# Patient Record
Sex: MALE | Race: WHITE | Employment: FULL TIME | URBAN - METROPOLITAN AREA
[De-identification: names, ages, dates, MRNs, and addresses within clinical notes are randomized per-mention and may not be internally consistent; named-entity substitution may affect disease eponyms.]

---

## 2017-06-22 ENCOUNTER — LAB ENCOUNTER (OUTPATIENT)
Dept: LAB | Age: 52
End: 2017-06-22
Attending: FAMILY MEDICINE
Payer: COMMERCIAL

## 2017-06-22 ENCOUNTER — TELEPHONE (OUTPATIENT)
Dept: FAMILY MEDICINE CLINIC | Facility: CLINIC | Age: 52
End: 2017-06-22

## 2017-06-22 ENCOUNTER — OFFICE VISIT (OUTPATIENT)
Dept: FAMILY MEDICINE CLINIC | Facility: CLINIC | Age: 52
End: 2017-06-22

## 2017-06-22 VITALS
HEART RATE: 68 BPM | HEIGHT: 69 IN | TEMPERATURE: 98 F | DIASTOLIC BLOOD PRESSURE: 78 MMHG | BODY MASS INDEX: 39.55 KG/M2 | SYSTOLIC BLOOD PRESSURE: 130 MMHG | WEIGHT: 267 LBS | RESPIRATION RATE: 12 BRPM

## 2017-06-22 DIAGNOSIS — Z12.5 SCREENING FOR PROSTATE CANCER: ICD-10-CM

## 2017-06-22 DIAGNOSIS — Z13.29 SCREENING FOR THYROID DISORDER: ICD-10-CM

## 2017-06-22 DIAGNOSIS — Z13.220 SCREENING, LIPID: ICD-10-CM

## 2017-06-22 DIAGNOSIS — Z13.1 SCREENING FOR DIABETES MELLITUS: ICD-10-CM

## 2017-06-22 DIAGNOSIS — R07.9 CHEST PAIN, UNSPECIFIED TYPE: ICD-10-CM

## 2017-06-22 DIAGNOSIS — Z13.0 SCREENING, ANEMIA, DEFICIENCY, IRON: ICD-10-CM

## 2017-06-22 DIAGNOSIS — Z13.29 SCREENING FOR THYROID DISORDER: Primary | ICD-10-CM

## 2017-06-22 DIAGNOSIS — Z82.49 FAMILY HISTORY OF HEART DISEASE: ICD-10-CM

## 2017-06-22 DIAGNOSIS — Z83.49 FAMILY HISTORY OF THYROID DISEASE IN MOTHER: ICD-10-CM

## 2017-06-22 PROCEDURE — 36415 COLL VENOUS BLD VENIPUNCTURE: CPT

## 2017-06-22 PROCEDURE — 84443 ASSAY THYROID STIM HORMONE: CPT

## 2017-06-22 PROCEDURE — 99204 OFFICE O/P NEW MOD 45 MIN: CPT | Performed by: FAMILY MEDICINE

## 2017-06-22 PROCEDURE — 85025 COMPLETE CBC W/AUTO DIFF WBC: CPT

## 2017-06-22 NOTE — PROGRESS NOTES
Gigi Blood is a 47year old male. S:  Patient presents today with the following concerns:  · Patient here for thyroid testing. Both parents with thyroid disease. Fatigue. Weight gain. · Rash on back. ? Ring worm. Son is a wrestler.     · 76 year intact,motor and sensory are grossly intact    ASSESSMENT AND PLAN:  Richard Alcantara is a 47year old male.   Screening for thyroid disorder  (primary encounter diagnosis)  Screening, anemia, deficiency, iron  Screening for prostate cancer  Family history of he

## 2017-06-22 NOTE — TELEPHONE ENCOUNTER
Patient was in today and we did not discuss colon cancer screening. Recommend colonoscopy or FIT testing if he declines colonoscopy. May refer to Dr. Toya Owens. Thanks.

## 2017-06-22 NOTE — PATIENT INSTRUCTIONS
Risk Factors for Heart Disease  A risk factor is something that increases your chance of having heart disease. Heart disease (also called coronary artery disease) involves damage to the heart arteries.  These blood vessels need to work well to provide the This is the most important risk factor you can change. Smoking causes inflammation and damages the smooth muscle that lines the arteries making them less flexible. It also raises your blood pressure, causing further damage to the artery lining.  Smoking als Diabetes occurs when you have high levels of sugar (glucose) in your blood. This can damage arteries if not kept under control. Having diabetes also makes you more likely to have a silent heart attack—one without any symptoms.   Excess weight  Excess weight

## 2017-09-18 ENCOUNTER — LABORATORY ENCOUNTER (OUTPATIENT)
Dept: LAB | Age: 52
End: 2017-09-18
Attending: INTERNAL MEDICINE
Payer: COMMERCIAL

## 2017-09-18 DIAGNOSIS — Z12.11 SPECIAL SCREENING FOR MALIGNANT NEOPLASM OF COLON: Primary | ICD-10-CM

## 2017-09-18 PROCEDURE — 88305 TISSUE EXAM BY PATHOLOGIST: CPT

## 2017-10-07 ENCOUNTER — APPOINTMENT (OUTPATIENT)
Dept: LAB | Age: 52
End: 2017-10-07
Attending: FAMILY MEDICINE
Payer: COMMERCIAL

## 2017-10-07 DIAGNOSIS — Z13.220 SCREENING, LIPID: ICD-10-CM

## 2017-10-07 DIAGNOSIS — Z13.1 SCREENING FOR DIABETES MELLITUS: ICD-10-CM

## 2017-10-07 PROCEDURE — 80061 LIPID PANEL: CPT

## 2017-10-07 PROCEDURE — 80053 COMPREHEN METABOLIC PANEL: CPT

## 2017-10-07 PROCEDURE — 36415 COLL VENOUS BLD VENIPUNCTURE: CPT

## 2017-10-10 DIAGNOSIS — R73.9 ELEVATED SERUM GLUCOSE: Primary | ICD-10-CM

## 2017-10-10 DIAGNOSIS — E78.00 ELEVATED CHOLESTEROL: ICD-10-CM

## 2018-06-01 ENCOUNTER — HOSPITAL ENCOUNTER (INPATIENT)
Facility: HOSPITAL | Age: 53
LOS: 2 days | Discharge: HOME | DRG: 392 | End: 2018-06-03
Attending: EMERGENCY MEDICINE | Admitting: HOSPITALIST
Payer: COMMERCIAL

## 2018-06-01 ENCOUNTER — HOSPITAL ENCOUNTER (OUTPATIENT)
Age: 53
Discharge: ANOTHER HOSPITAL | End: 2018-06-01
Attending: FAMILY MEDICINE
Payer: COMMERCIAL

## 2018-06-01 ENCOUNTER — APPOINTMENT (OUTPATIENT)
Dept: CT IMAGING | Age: 53
End: 2018-06-01
Attending: FAMILY MEDICINE
Payer: COMMERCIAL

## 2018-06-01 ENCOUNTER — TELEPHONE (OUTPATIENT)
Dept: FAMILY MEDICINE CLINIC | Facility: CLINIC | Age: 53
End: 2018-06-01

## 2018-06-01 VITALS
SYSTOLIC BLOOD PRESSURE: 127 MMHG | OXYGEN SATURATION: 96 % | HEIGHT: 69 IN | RESPIRATION RATE: 16 BRPM | WEIGHT: 255 LBS | BODY MASS INDEX: 37.77 KG/M2 | DIASTOLIC BLOOD PRESSURE: 93 MMHG | HEART RATE: 96 BPM | TEMPERATURE: 98 F

## 2018-06-01 DIAGNOSIS — R82.2 BILIRUBINURIA: ICD-10-CM

## 2018-06-01 DIAGNOSIS — R10.32 LLQ ABDOMINAL PAIN: ICD-10-CM

## 2018-06-01 DIAGNOSIS — K57.92 ACUTE DIVERTICULITIS: Primary | ICD-10-CM

## 2018-06-01 DIAGNOSIS — K56.609 SMALL BOWEL OBSTRUCTION (HCC): ICD-10-CM

## 2018-06-01 DIAGNOSIS — K56.609 SBO (SMALL BOWEL OBSTRUCTION) (HCC): ICD-10-CM

## 2018-06-01 PROCEDURE — 5A09357 ASSISTANCE WITH RESPIRATORY VENTILATION, LESS THAN 24 CONSECUTIVE HOURS, CONTINUOUS POSITIVE AIRWAY PRESSURE: ICD-10-PCS | Performed by: HOSPITALIST

## 2018-06-01 PROCEDURE — 81002 URINALYSIS NONAUTO W/O SCOPE: CPT | Performed by: FAMILY MEDICINE

## 2018-06-01 PROCEDURE — 99223 1ST HOSP IP/OBS HIGH 75: CPT | Performed by: HOSPITALIST

## 2018-06-01 PROCEDURE — 99204 OFFICE O/P NEW MOD 45 MIN: CPT

## 2018-06-01 PROCEDURE — 99214 OFFICE O/P EST MOD 30 MIN: CPT

## 2018-06-01 PROCEDURE — 80047 BASIC METABLC PNL IONIZED CA: CPT

## 2018-06-01 PROCEDURE — 85025 COMPLETE CBC W/AUTO DIFF WBC: CPT | Performed by: FAMILY MEDICINE

## 2018-06-01 PROCEDURE — 74176 CT ABD & PELVIS W/O CONTRAST: CPT | Performed by: FAMILY MEDICINE

## 2018-06-01 PROCEDURE — 36415 COLL VENOUS BLD VENIPUNCTURE: CPT

## 2018-06-01 PROCEDURE — 99253 IP/OBS CNSLTJ NEW/EST LOW 45: CPT | Performed by: SURGERY

## 2018-06-01 RX ORDER — HYDROMORPHONE HYDROCHLORIDE 1 MG/ML
0.2 INJECTION, SOLUTION INTRAMUSCULAR; INTRAVENOUS; SUBCUTANEOUS EVERY 2 HOUR PRN
Status: DISCONTINUED | OUTPATIENT
Start: 2018-06-01 | End: 2018-06-03

## 2018-06-01 RX ORDER — METRONIDAZOLE 500 MG/100ML
500 INJECTION, SOLUTION INTRAVENOUS EVERY 8 HOURS
Status: DISCONTINUED | OUTPATIENT
Start: 2018-06-01 | End: 2018-06-03

## 2018-06-01 RX ORDER — HYDROMORPHONE HYDROCHLORIDE 1 MG/ML
0.8 INJECTION, SOLUTION INTRAMUSCULAR; INTRAVENOUS; SUBCUTANEOUS EVERY 2 HOUR PRN
Status: DISCONTINUED | OUTPATIENT
Start: 2018-06-01 | End: 2018-06-03

## 2018-06-01 RX ORDER — METRONIDAZOLE 500 MG/100ML
500 INJECTION, SOLUTION INTRAVENOUS ONCE
Status: COMPLETED | OUTPATIENT
Start: 2018-06-01 | End: 2018-06-01

## 2018-06-01 RX ORDER — ONDANSETRON 2 MG/ML
4 INJECTION INTRAMUSCULAR; INTRAVENOUS EVERY 6 HOURS PRN
Status: DISCONTINUED | OUTPATIENT
Start: 2018-06-01 | End: 2018-06-03

## 2018-06-01 RX ORDER — ENOXAPARIN SODIUM 100 MG/ML
40 INJECTION SUBCUTANEOUS EVERY EVENING
Status: DISCONTINUED | OUTPATIENT
Start: 2018-06-01 | End: 2018-06-03

## 2018-06-01 RX ORDER — SODIUM CHLORIDE 9 MG/ML
125 INJECTION, SOLUTION INTRAVENOUS CONTINUOUS
Status: DISCONTINUED | OUTPATIENT
Start: 2018-06-01 | End: 2018-06-03

## 2018-06-01 RX ORDER — SODIUM CHLORIDE 9 MG/ML
INJECTION, SOLUTION INTRAVENOUS CONTINUOUS
Status: DISCONTINUED | OUTPATIENT
Start: 2018-06-01 | End: 2018-06-03

## 2018-06-01 RX ORDER — FAMOTIDINE 10 MG/ML
20 INJECTION, SOLUTION INTRAVENOUS DAILY
Status: DISCONTINUED | OUTPATIENT
Start: 2018-06-01 | End: 2018-06-03

## 2018-06-01 RX ORDER — HYDROMORPHONE HYDROCHLORIDE 1 MG/ML
0.4 INJECTION, SOLUTION INTRAMUSCULAR; INTRAVENOUS; SUBCUTANEOUS EVERY 2 HOUR PRN
Status: DISCONTINUED | OUTPATIENT
Start: 2018-06-01 | End: 2018-06-03

## 2018-06-01 RX ORDER — CLOTRIMAZOLE AND BETAMETHASONE DIPROPIONATE 10; .64 MG/G; MG/G
1 CREAM TOPICAL 2 TIMES DAILY PRN
COMMUNITY
End: 2019-03-29

## 2018-06-01 RX ORDER — METOCLOPRAMIDE HYDROCHLORIDE 5 MG/ML
10 INJECTION INTRAMUSCULAR; INTRAVENOUS EVERY 8 HOURS PRN
Status: DISCONTINUED | OUTPATIENT
Start: 2018-06-01 | End: 2018-06-03

## 2018-06-01 NOTE — TELEPHONE ENCOUNTER
Pt c/o constipation and abdominal cramping x 3 days , fever 101 yesterday- no temp taken today and fatigue x 3 days. Advised Pt to go to Immediate Care for evaluation.  Pt verbalized understanding

## 2018-06-01 NOTE — PROGRESS NOTES
[facility] Pharmacy Note: Antimicrobial Weight Dose Adjustment for: ceftriaxone (ROCEPHIN)    Daniel Mendez is a 47year old male who has been prescribed ceftriaxone (ROCEPHIN) 1g q24hr    Weight: 115.7kg  BMI: 37.66 kg/m².     Based on this criteria the dose was

## 2018-06-01 NOTE — ED PROVIDER NOTES
Patient Seen in: [address] [facility]    History   Patient presents with:  Abdomen/Flank Pain (GI/)  Constipation (gastrointestinal)  Fever (infectious)    Stated Complaint: abd cramping/constipation/fever    HPI    This 61-year-old male pre Temporal  SpO2: 96 %  O2 Device: None (Room air)    Current:/85   Pulse 84   Temp 97.6 °F (36.4 °C) (Temporal)   Resp 14   Ht 175.3 cm (5' 9\")   Wt 115.7 kg   SpO2 96%   BMI 37.66 kg/m²         Physical Exam    General: WH/WN/WD, in mild discomfort Cristela, CT ABD   PEL W/CONT - SET, 7/06/2011, 14:33. INDICATIONS:  abd cramping/constipation/fever  TECHNIQUE:  Unenhanced multislice CT scanning was performed from the dome of the diaphragm to the pubic symphysis. Dose reduction techniques were used.  Dose is a 5-6 cm segment of acute sigmoid diverticulitis. There is marked infiltration of the fat with phlegmon extending superiorly in the mesenteric fat to involving loops of bowel in the left upper pelvis/lower abdomen .   There is a transition zone with mod

## 2018-06-01 NOTE — H&P
[facility] HOSPITALIST  History and Physical     Will Conn Patient Status:  Emergency    1965 MRN [id number]   Location [address] [facility] Attending Concetta Miller MD   Hosp Day # 0 PCP Alvin Steven MD     Chief Complaint: Ford Gomez 10-14 days. Disp: 60 g Rfl: 1       Review of Systems:   A comprehensive 14 point review of systems was completed. Pertinent positives and negatives noted in the HPI.     Physical Exam:    /71   Pulse 78   Temp 98.3 °F (36.8 °C) (Temporal)   Resp 1

## 2018-06-01 NOTE — ED INITIAL ASSESSMENT (HPI)
Pt reports 3 days of abd pain and constipation. Went to IC and diagnosed with diverticulitis and SBO. Presents to ER with IV intact and by personal vehicle.

## 2018-06-01 NOTE — ED PROVIDER NOTES
Patient Seen in: [facility] Emergency Department    History   Patient presents with:  Abdomen/Flank Pain (GI/)    Stated Complaint: from [facility] for bowel obstruction and diverticulitis, LLQ pain.      HPI    Patient has been having problems of constip Normal range of motion. Neck supple. No JVD present. Cardiovascular: Normal rate and regular rhythm. Pulmonary/Chest: Effort normal and breath sounds normal. No stridor. Abdominal: Soft. Mild tenderness left lower quadrant.   Not distended, scant rayray

## 2018-06-01 NOTE — CONSULTS
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 No acute fracture. LUNG BASES:  No visible pulmonary or pleural disease.     OTHER:  Negative.       =====  CONCLUSION:  There is a 5-6 cm segment of acute sigmoid diverticulitis membranes are moist.    Lungs: Clear to auscultation bilaterally. Cardiac: Regular rate and rhythm. No murmur. Abdomen:  Soft, non-distended. LLQ/suprapubic tenderness to palpation. No rebound or guarding. No peritoneal signs. No ascites.  .    Tad Shall

## 2018-06-02 PROCEDURE — 99233 SBSQ HOSP IP/OBS HIGH 50: CPT | Performed by: SURGERY

## 2018-06-02 PROCEDURE — 99232 SBSQ HOSP IP/OBS MODERATE 35: CPT | Performed by: SURGERY

## 2018-06-02 PROCEDURE — 99233 SBSQ HOSP IP/OBS HIGH 50: CPT | Performed by: HOSPITALIST

## 2018-06-02 RX ORDER — POTASSIUM CHLORIDE 20 MEQ/1
40 TABLET, EXTENDED RELEASE ORAL ONCE
Status: DISCONTINUED | OUTPATIENT
Start: 2018-06-02 | End: 2018-06-03

## 2018-06-02 NOTE — PROGRESS NOTES
[facility]  Progress Note    Roseline Cranker Patient Status:  Observation    1965 MRN [id number]   [facility] 3NW-A Attending Karen Paul MD   Hosp Day # 0 PCP Tierney Akhtar MD     Subjective:  Feels better this morning.   No nause Start clear liquid diet. 3. Continue IV antibiotics. 4. Encouraged ambulation, up in chair. 5. No acute surgical intervention required at this time. 6. All questions answered.       Viji Day MD  6/2/2018  7:25 AM

## 2018-06-02 NOTE — PLAN OF CARE
Pt tolerated CL for breakfast but c/o slight nausea at this time. Pt rating abd pain at 1. Pt instructed to take only water for now in sm amt until nausea passes completely. Pt reported that he flet warm - temp 98.9.

## 2018-06-02 NOTE — RESPIRATORY THERAPY NOTE
CLAUDE - Equipment Use Daily Summary:  · Set Mode CPAP  · Usage in hours: 9:24  · 90% Pressure (EPAP) level: 10.0  · 90% Insp Pressure (IPAP):   · AHI: 1.6  · Supplemental Oxygen:   · Comments:

## 2018-06-02 NOTE — PROGRESS NOTES
[facility] HOSPITALIST  Progress Note     Pheobe Muss Patient Status:  Inpatient    1965 MRN [id number]   [facility] 3NW-A Attending Spring Gonzalez MD   Hosp Day # 1 PCP Daniel Lofton MD     Chief Complaint: Abdominal pain    S: Patient s obstruction  1. Start CLD, cont pain control  2. IV abx  3. Surgery following  2.  CLAUDE       Plan of care: CLD, cont IV abx    Quality:  · DVT Prophylaxis: lovenox  · CODE status: Full  · Cormier: no  · Central line: no    Estimated date of discharge: 6/3-6/4

## 2018-06-03 VITALS
DIASTOLIC BLOOD PRESSURE: 93 MMHG | SYSTOLIC BLOOD PRESSURE: 148 MMHG | TEMPERATURE: 98 F | HEART RATE: 61 BPM | WEIGHT: 255 LBS | HEIGHT: 69 IN | BODY MASS INDEX: 37.77 KG/M2 | OXYGEN SATURATION: 98 % | RESPIRATION RATE: 18 BRPM

## 2018-06-03 PROCEDURE — 99232 SBSQ HOSP IP/OBS MODERATE 35: CPT | Performed by: SURGERY

## 2018-06-03 PROCEDURE — 99233 SBSQ HOSP IP/OBS HIGH 50: CPT | Performed by: SURGERY

## 2018-06-03 PROCEDURE — 99239 HOSP IP/OBS DSCHRG MGMT >30: CPT | Performed by: HOSPITALIST

## 2018-06-03 RX ORDER — AMOXICILLIN AND CLAVULANATE POTASSIUM 875; 125 MG/1; MG/1
1 TABLET, FILM COATED ORAL 2 TIMES DAILY
Qty: 20 TABLET | Refills: 0 | Status: SHIPPED | OUTPATIENT
Start: 2018-06-03 | End: 2018-06-13

## 2018-06-03 NOTE — PROGRESS NOTES
[facility]  Progress Note    Thania Braxton Patient Status:  Inpatient    1965 MRN [id number]   [facility] 3NW-A Attending Erick Michaels MD   Hosp Day # 2 PCP Princess Velazquez MD     Subjective:  Feels good. No pain.   Two BMs yesterd

## 2018-06-03 NOTE — PLAN OF CARE
Problem: Patient/Family Goals  Goal: Patient/Family Short Term Goal  Patient's Short Term Goal: \"Eat solid food and go home\".     Interventions:   - continue plan of care, diet advanced as tolerated, IV abx.  -Advance diet when ordered by physician, monit

## 2018-06-03 NOTE — PLAN OF CARE
Problem: Patient/Family Goals  Goal: Patient/Family Long Term Goal  Patient's Long Term Goal: \"To eat and go home\"    Interventions:  - Follow plan of care including diet as ordered by physician and antibiotics.  Diet advanced to FLD for BF, if tolerated Obtain nutritional consult as needed  - Evaluate fluid balance    Outcome: Adequate for Discharge  Pt denies nausea, tolerating diet advancement so far well.   Goal: Maintains or returns to baseline bowel function  INTERVENTIONS:  - Assess bowel function  -

## 2018-06-03 NOTE — RESPIRATORY THERAPY NOTE
CLAUDE - Equipment Use Daily Summary:  · Set Mode CFLEX  · Usage in hours: 8  · 90% Pressure (EPAP) level:   · 90% Insp Pressure (IPAP): 10  · AHI: 2  · Supplemental Oxygen:  · Comments:

## 2018-06-03 NOTE — PLAN OF CARE
Pt did well, tolerated soft diet for lunch, had some eggs and Singaporean toast. No nausea, no pain. Prescription for Augmentin and low residue diet reviewed, discharging instructions as well with both pt and wife. All questions answered. Voiced understanding.

## 2018-06-03 NOTE — PROGRESS NOTES
NO HOSPITALIST  Progress Note     Mine Otto Patient Status:  Inpatient    1965 MRN [id number]   [facility] 3NW-A Attending Cory Calderon MD   Hosp Day # 2 PCP Nayeli Olson MD     Chief Complaint: Abdominal pain    S: Patient d obstruction  1. Resolving  2. Tolerating diet, pain controlled  3. Home on PO abx  2.  CLAUDE       Plan of care: d/c home today    Quality:  · DVT Prophylaxis: lovenox  · CODE status: Full  · Cormier: no  · Central line: no    Estimated date of discharge: 6/3

## 2018-06-04 NOTE — DISCHARGE SUMMARY
[facility] HOSPITALIST  DISCHARGE SUMMARY     Gilbert Muse Patient Status:  Inpatient    1965 MRN [id number]   [facility] 3NW-A Attending No att. providers found   Hosp Day # 2 PCP Jenifer Roa MD     Date of Admission: 2018  Date of pain medication, antiemetics. His symptoms improved. He was started on clear liquid diet which was advanced and tolerated well by the patient. He was cleared for discharge per surgery and discharged home on antibiotics to complete a course of 10 days. edema.  -----------------------------------------------------------------------------------------------  PATIENT DISCHARGE INSTRUCTIONS: See electronic chart    Yisel Ruggiero MD 6/4/2018    Time spent:  > 30 minutes

## 2018-06-05 ENCOUNTER — PATIENT OUTREACH (OUTPATIENT)
Dept: CASE MANAGEMENT | Age: 53
End: 2018-06-05

## 2018-06-05 DIAGNOSIS — K57.92 ACUTE DIVERTICULITIS: ICD-10-CM

## 2018-06-05 DIAGNOSIS — K56.609 SBO (SMALL BOWEL OBSTRUCTION) (HCC): ICD-10-CM

## 2018-06-06 ENCOUNTER — TELEPHONE (OUTPATIENT)
Dept: FAMILY MEDICINE CLINIC | Facility: CLINIC | Age: 53
End: 2018-06-06

## 2018-06-06 NOTE — TELEPHONE ENCOUNTER
Called patient to scheduled HFU and patient stated he was told to follow up with Dr. Gilberto Burt. Patient requesting to speak to nurse to clarify why he needs to see Dr. Marine Moon for the follow up.

## 2018-06-06 NOTE — TELEPHONE ENCOUNTER
Patient was discharged from [facility] on 6/3/18 and is at High risk for readmission and recommended to have a TCM HFU appointment by 6/10/18. No later than 6/17/18. Multiple attempts to reach patient have been made.      Triage: Please call the red

## 2018-06-06 NOTE — TELEPHONE ENCOUNTER
Called and talked to patient explained why he needs a follow up post hospitalization he then made an appointment for tomorrow with Dr Sanchez Herzog

## 2018-06-07 ENCOUNTER — OFFICE VISIT (OUTPATIENT)
Dept: FAMILY MEDICINE CLINIC | Facility: CLINIC | Age: 53
End: 2018-06-07

## 2018-06-07 VITALS
HEART RATE: 76 BPM | HEIGHT: 70 IN | SYSTOLIC BLOOD PRESSURE: 138 MMHG | DIASTOLIC BLOOD PRESSURE: 76 MMHG | BODY MASS INDEX: 36.65 KG/M2 | RESPIRATION RATE: 16 BRPM | WEIGHT: 256 LBS | TEMPERATURE: 99 F

## 2018-06-07 DIAGNOSIS — K56.609 SBO (SMALL BOWEL OBSTRUCTION) (HCC): Primary | ICD-10-CM

## 2018-06-07 DIAGNOSIS — K57.92 ACUTE DIVERTICULITIS: ICD-10-CM

## 2018-06-07 PROCEDURE — 99495 TRANSJ CARE MGMT MOD F2F 14D: CPT | Performed by: FAMILY MEDICINE

## 2018-06-07 NOTE — PROGRESS NOTES
HPI:    Ashley Davis is a 47year old male here today for hospital follow up. He was discharged from Inpatient hospital, [facility] to Home   Admit Date: 6/1 6/3  Discharge Date: 6/3  Hospital Discharge Diagnosis:    SBO/sigmoid diverticulitis. (pediatric) ([address] split night 11-4-16); and Sleep apnea. He  has a past surgical history that includes vasectomy (2002); tonsillectomy (1971); and excision of uvula (2002).     He family history includes Obesity in his mother; hypoglycemia in his mothe CVA tenderness and negative Gutierres's sign. Neurological: He is alert and oriented to person, place, and time. Skin: Skin is warm and dry. Psychiatric: He has a normal mood and affect.  Judgment and thought content normal.     ASSESSMENT/ PLAN:   Diagn

## 2018-06-12 NOTE — PAYOR COMM NOTE
--------------  CONTINUED STAY REVIEW    Payor: [address]  Subscriber #:  [id number]  Authorization Number: [id number]    Admit date: 6/1/18  Admit time: 56    Admitting Physician: Kasey Fowler MD  Attending Physician:  Priyanka att. providers 06/02/18 06/03/18   famoTIDine (PEPCID) injection 20 mg Order Report Daily 06/01/18 06/03/18   HYDROmorphone HCl (DILAUDID) 1 MG/ML injection 0.2 mg - Every 2 hour PRN 06/01/18 06/03/18   HYDROmorphone HCl (DILAUDID) 1 MG/ML injection 0.4 mg - Every 2 hour

## 2018-06-12 NOTE — PAYOR COMM NOTE
--------------  DISCHARGE REVIEW    Payor: [address]  Subscriber #:  [id number]  Authorization Number: [id number]    Admit date: 6/1/18  Admit time:  1624  Discharge Date: 6/3/2018 12:12 PM     Admitting Physician: Valentina Bain MD  Attendin lightheadedness or dizziness. He denies any previous episodes of diverticulitis. His pain has improved. CT abdomen reveals acute diverticulitis, phlegmon and possible obstruction.     Brief Synopsis: Patient was admitted with complaints of abdominal pain n/a    Follow-up appointment:   MD REINALDO [address]  [address]  [phone]  In 2 weeks    Physical Exam:    General: No acute distress. Respiratory: Clear to auscultation bilaterally. No wheezes. No rhonchi.   Isabel Dracut

## 2018-06-12 NOTE — PAYOR COMM NOTE
--------------  ADMISSION REVIEW     Payor: [address]  Subscriber #:  [id number]  Authorization Number: [id number]    Admit date: 6/1/18  Admit time: 56       Admitting Physician: Fred Hernandez MD  Attending Physician:  Priyanka attCory dawkins Wt 115.7 kg   SpO2 97%   BMI 37.66 kg/m²      Physical Exam  Constitutional: Pt is oriented to person, place, and time. Appears well-developed and well-nourished. Head: Normocephalic and atraumatic.    Nose: Nose normal.   Eyes: EOM are normal. Pupils ar NO HOSPITALIST  History and Physical     [facility] Patient Status:  Emergency    1965 MRN [id number]   Location [facility] Attending Deena Beyer MD   Hosp Day # 0 PCP Abbi Goldman MD     Chief Complaint: Abd rhonchi. Cardiovascular: S1, S2. Regular rate and rhythm. No murmurs, rubs or gallops. Equal pulses. Chest and Back: No tenderness or deformity. Abdomen: Soft, LLQ/suprapubic tenderness, nondistended. Positive bowel sounds.  No rebound, guarding or org patient's symptoms fail to resolve or this becomes a recurring problem. · Pt will need an NGT if he has any further episodes of emesis. · No acute surgical intervention required at this time. · All questions answered. Case d/w Dr. Yasmin Kennedy.   Geoff Cisneros,

## 2018-06-25 ENCOUNTER — OFFICE VISIT (OUTPATIENT)
Dept: SURGERY | Facility: CLINIC | Age: 53
End: 2018-06-25

## 2018-06-25 VITALS
HEIGHT: 69 IN | TEMPERATURE: 99 F | SYSTOLIC BLOOD PRESSURE: 143 MMHG | BODY MASS INDEX: 37.77 KG/M2 | DIASTOLIC BLOOD PRESSURE: 90 MMHG | WEIGHT: 255 LBS | HEART RATE: 63 BPM

## 2018-06-25 DIAGNOSIS — K57.92 ACUTE DIVERTICULITIS: Primary | ICD-10-CM

## 2018-06-25 PROCEDURE — 99213 OFFICE O/P EST LOW 20 MIN: CPT | Performed by: SURGERY

## 2018-06-25 NOTE — PATIENT INSTRUCTIONS
HIGH FIBER DIET    This high fiber diet includes two major components. One is a natural high fiber that is dietary high. The other is a fiber supplement.     Natural Dietary Fiber:    These are very few products on the market that have high enough fiber c are simply an equivalent to a ground up apple peel. That is they just provide extra natural fiber. They will enter your colon in a non-digestable form providing bulk to your stool.   Therefore, they are safe to take on a daily basis for the rest of you li problems:    1. Most people describe a gassy or bloated feeling for the first ten days to two weeks. This will pass with time. I recommend that once you get on the diet that you stay on it and complete it as prescribed.   Again, you will enjoy normal maria teresa

## 2018-06-25 NOTE — PROGRESS NOTES
Follow Up Visit Note       Active Problems      1. Acute diverticulitis          Chief Complaint   Patient presents with:  Diverticulitis: 2 week f/u from hospital-diverticulitis. feels fine. denies pain        History of Present Illness    Doing well.   Ea Review of Systems has been reviewed by me during today. Review of Systems   Constitutional: Negative for chills, diaphoresis, fatigue, fever and unexpected weight change. HENT: Negative for hearing loss, nosebleeds, sore throat and trouble swallowing. orders of the defined types were placed in this encounter. Imaging & Referrals   None    Follow Up  No Follow-up on file.     Caren Zuniga MD

## 2019-03-23 ENCOUNTER — TELEPHONE (OUTPATIENT)
Dept: FAMILY MEDICINE CLINIC | Facility: CLINIC | Age: 54
End: 2019-03-23

## 2019-03-23 NOTE — TELEPHONE ENCOUNTER
Amy Bolanos made appointment through [address] with Melody Scott for Wednesday 3/27/19 with the following comment: Rash, probably ringworm but otc creams aren’t helping.  [facility] for Amy Bolanos, letting him know there are appointments sooner than Wednesday with other provide

## 2019-03-23 NOTE — TELEPHONE ENCOUNTER
Pt scheduled an appt dm/Sharif Ledbetter on 3/29/19 for (Rash Ringworm) Is it ok for pt to wait until then?

## 2019-03-29 ENCOUNTER — OFFICE VISIT (OUTPATIENT)
Dept: FAMILY MEDICINE CLINIC | Facility: CLINIC | Age: 54
End: 2019-03-29
Payer: COMMERCIAL

## 2019-03-29 ENCOUNTER — TELEPHONE (OUTPATIENT)
Dept: FAMILY MEDICINE CLINIC | Facility: CLINIC | Age: 54
End: 2019-03-29

## 2019-03-29 VITALS
DIASTOLIC BLOOD PRESSURE: 82 MMHG | BODY MASS INDEX: 40.29 KG/M2 | WEIGHT: 272 LBS | HEART RATE: 68 BPM | TEMPERATURE: 99 F | SYSTOLIC BLOOD PRESSURE: 138 MMHG | RESPIRATION RATE: 12 BRPM | HEIGHT: 69 IN

## 2019-03-29 DIAGNOSIS — B35.4 TINEA CORPORIS: Primary | ICD-10-CM

## 2019-03-29 DIAGNOSIS — L24.9 IRRITANT CONTACT DERMATITIS, UNSPECIFIED TRIGGER: ICD-10-CM

## 2019-03-29 PROCEDURE — 99213 OFFICE O/P EST LOW 20 MIN: CPT | Performed by: PHYSICIAN ASSISTANT

## 2019-03-29 RX ORDER — CLOTRIMAZOLE AND BETAMETHASONE DIPROPIONATE 10; .64 MG/G; MG/G
1 CREAM TOPICAL 2 TIMES DAILY PRN
Qty: 45 G | Refills: 0 | Status: SHIPPED | OUTPATIENT
Start: 2019-03-29 | End: 2019-07-31

## 2019-03-29 NOTE — TELEPHONE ENCOUNTER
Chart reviewed. Patient was seen for ringworm in 2014. Patient is requesting a refill of the Lotrisone cream he was previously prescriped. He states he has been using it and it is working well, but it is almost gone.      Routed to Francisco  Janice Marin PA-C

## 2019-03-29 NOTE — TELEPHONE ENCOUNTER
Patient scheduled an appointment for today for a rash, possibly ringworm, has had this before and his prescription has no refills, patient wondering if his cream can be refilled without him having to come in

## 2019-03-30 NOTE — PROGRESS NOTES
Patient presents with:  Rash: on back and right leg       HISTORY OF PRESENT ILLNESS  Emil Galloway is a 47year old male who presents for evaluation of rash. Has had similar rash before, feels that this is ring worm.  Had tried OTC clotrimazole and [place] frames. Avoid irritating soaps and lotions in this area. Patient expresses understanding and agreement with above plan.   Francisco & Janice Marin, SHEREE

## 2019-07-31 ENCOUNTER — OFFICE VISIT (OUTPATIENT)
Dept: FAMILY MEDICINE CLINIC | Facility: CLINIC | Age: 54
End: 2019-07-31
Payer: COMMERCIAL

## 2019-07-31 VITALS
HEART RATE: 68 BPM | RESPIRATION RATE: 14 BRPM | BODY MASS INDEX: 37.62 KG/M2 | DIASTOLIC BLOOD PRESSURE: 80 MMHG | TEMPERATURE: 99 F | SYSTOLIC BLOOD PRESSURE: 138 MMHG | WEIGHT: 254 LBS | HEIGHT: 69 IN

## 2019-07-31 DIAGNOSIS — Z00.00 WELL ADULT EXAM: Primary | ICD-10-CM

## 2019-07-31 DIAGNOSIS — Z00.00 LABORATORY EXAMINATION ORDERED AS PART OF A COMPLETE PHYSICAL EXAMINATION: ICD-10-CM

## 2019-07-31 DIAGNOSIS — G47.33 OBSTRUCTIVE SLEEP APNEA (ADULT) (PEDIATRIC): ICD-10-CM

## 2019-07-31 DIAGNOSIS — M25.562 ACUTE PAIN OF LEFT KNEE: ICD-10-CM

## 2019-07-31 PROCEDURE — 99396 PREV VISIT EST AGE 40-64: CPT | Performed by: PHYSICIAN ASSISTANT

## 2019-08-01 NOTE — PROGRESS NOTES
DATE OF EXAM  7/31/2019    CHIEF COMPLAINT/REASON FOR VISIT  Annual exam.    HISTORY OF PRESENT ILLNESS  Aj Smith presents today for routine annual physical examination.     - Left knee pain: Started about a month ago.  Pain seems to be localized to me ordered  Colon cancer screening- 2017    REVIEW OF SYSTEMS:   GENERAL: No fever, chills, or fatigue  ENDOCRINE: No weight loss or weight gain. No heat or cold intolerances. No increased thirst.  HEENT: No problems with head, eyes, ears, nose, throat.   No c Some laxity with varus stress. Positive Thessaly and [address] [place] [address]. SKIN/HAIR/NAILS: Skin color was normal.  There were no skin lesions. Hair and nails were normal.   EXTREMITIES: 2+ radial and posterior tibial pulses. No peripheral edema.   NEUROLOGIC: The p

## 2019-08-08 ENCOUNTER — TELEPHONE (OUTPATIENT)
Dept: FAMILY MEDICINE CLINIC | Facility: CLINIC | Age: 54
End: 2019-08-08

## 2019-08-08 NOTE — TELEPHONE ENCOUNTER
Left message that MRI of the left knee is approved with his insurance and he can go ahead and schedule appt at [phone]

## 2019-08-28 ENCOUNTER — LAB ENCOUNTER (OUTPATIENT)
Dept: LAB | Age: 54
End: 2019-08-28
Attending: PHYSICIAN ASSISTANT
Payer: COMMERCIAL

## 2019-08-28 DIAGNOSIS — Z00.00 LABORATORY EXAMINATION ORDERED AS PART OF A COMPLETE PHYSICAL EXAMINATION: ICD-10-CM

## 2019-08-28 LAB
ALBUMIN SERPL-MCNC: 4.2 G/DL (ref 3.4–5)
ALBUMIN/GLOB SERPL: 1.4 {RATIO} (ref 1–2)
ALP LIVER SERPL-CCNC: 67 U/L (ref 45–117)
ALT SERPL-CCNC: 41 U/L (ref 16–61)
ANION GAP SERPL CALC-SCNC: 11 MMOL/L (ref 0–18)
AST SERPL-CCNC: 25 U/L (ref 15–37)
BILIRUB SERPL-MCNC: 1.2 MG/DL (ref 0.1–2)
BUN BLD-MCNC: 18 MG/DL (ref 7–18)
BUN/CREAT SERPL: 14.9 (ref 10–20)
CALCIUM BLD-MCNC: 9.4 MG/DL (ref 8.5–10.1)
CHLORIDE SERPL-SCNC: 103 MMOL/L (ref 98–112)
CHOLEST SMN-MCNC: 226 MG/DL (ref ?–200)
CO2 SERPL-SCNC: 26 MMOL/L (ref 21–32)
COMPLEXED PSA SERPL-MCNC: 0.81 NG/ML (ref ?–4)
CREAT BLD-MCNC: 1.21 MG/DL (ref 0.7–1.3)
DEPRECATED RDW RBC AUTO: 42.2 FL (ref 35.1–46.3)
ERYTHROCYTE [DISTWIDTH] IN BLOOD BY AUTOMATED COUNT: 13.9 % (ref 11–15)
GLOBULIN PLAS-MCNC: 3 G/DL (ref 2.8–4.4)
GLUCOSE BLD-MCNC: 81 MG/DL (ref 70–99)
HCT VFR BLD AUTO: 51.1 % (ref 39–53)
HDLC SERPL-MCNC: 41 MG/DL (ref 40–59)
HGB BLD-MCNC: 17.2 G/DL (ref 13–17.5)
LDLC SERPL CALC-MCNC: 160 MG/DL (ref ?–100)
M PROTEIN MFR SERPL ELPH: 7.2 G/DL (ref 6.4–8.2)
MCH RBC QN AUTO: 28.9 PG (ref 26–34)
MCHC RBC AUTO-ENTMCNC: 33.7 G/DL (ref 31–37)
MCV RBC AUTO: 85.9 FL (ref 80–100)
NONHDLC SERPL-MCNC: 185 MG/DL (ref ?–130)
OSMOLALITY SERPL CALC.SUM OF ELEC: 291 MOSM/KG (ref 275–295)
PLATELET # BLD AUTO: 193 10(3)UL (ref 150–450)
POTASSIUM SERPL-SCNC: 4.4 MMOL/L (ref 3.5–5.1)
RBC # BLD AUTO: 5.95 X10(6)UL (ref 4.3–5.7)
SODIUM SERPL-SCNC: 140 MMOL/L (ref 136–145)
TRIGL SERPL-MCNC: 123 MG/DL (ref 30–149)
TSI SER-ACNC: 2.8 MIU/ML (ref 0.36–3.74)
VLDLC SERPL CALC-MCNC: 25 MG/DL (ref 0–30)
WBC # BLD AUTO: 4.4 X10(3) UL (ref 4–11)

## 2019-08-28 PROCEDURE — 84153 ASSAY OF PSA TOTAL: CPT | Performed by: PHYSICIAN ASSISTANT

## 2019-08-28 PROCEDURE — 80050 GENERAL HEALTH PANEL: CPT | Performed by: PHYSICIAN ASSISTANT

## 2019-08-28 PROCEDURE — 80061 LIPID PANEL: CPT | Performed by: PHYSICIAN ASSISTANT

## 2019-10-11 ENCOUNTER — OFFICE VISIT (OUTPATIENT)
Dept: FAMILY MEDICINE CLINIC | Facility: CLINIC | Age: 54
End: 2019-10-11
Payer: COMMERCIAL

## 2019-10-11 VITALS
OXYGEN SATURATION: 98 % | SYSTOLIC BLOOD PRESSURE: 122 MMHG | HEIGHT: 68.25 IN | DIASTOLIC BLOOD PRESSURE: 80 MMHG | TEMPERATURE: 98 F | HEART RATE: 73 BPM | WEIGHT: 235 LBS | BODY MASS INDEX: 35.61 KG/M2 | RESPIRATION RATE: 18 BRPM

## 2019-10-11 DIAGNOSIS — R05.9 COUGH: Primary | ICD-10-CM

## 2019-10-11 PROCEDURE — 99214 OFFICE O/P EST MOD 30 MIN: CPT | Performed by: FAMILY MEDICINE

## 2019-10-11 RX ORDER — AZITHROMYCIN 250 MG/1
TABLET, FILM COATED ORAL
Qty: 6 TABLET | Refills: 0 | Status: SHIPPED | OUTPATIENT
Start: 2019-10-11 | End: 2019-10-16 | Stop reason: ALTCHOICE

## 2019-10-11 RX ORDER — BENZONATATE 100 MG/1
100 CAPSULE ORAL 3 TIMES DAILY PRN
Qty: 30 CAPSULE | Refills: 0 | Status: SHIPPED | OUTPATIENT
Start: 2019-10-11 | End: 2019-11-04 | Stop reason: ALTCHOICE

## 2019-10-11 NOTE — PROGRESS NOTES
CHIEF COMPLAINT: Patient presents with:  Cough: Pt c/o cough since the past weekend. Pt deenies any other symptoms. HPI:     Daryl Ramirez is a 49year old male presents for cough x 1.5 weeks. Yazmin Beltran is a 50 yo M p/w a 10 day history of cough.  Hi chills, fatigue and fever. HENT: Positive for postnasal drip and rhinorrhea. Negative for congestion, ear pain and sore throat. Respiratory: Positive for cough. Negative for shortness of breath, wheezing and stridor.     Gastrointestinal: Negative for 08/28/2019 103    • CO2 08/28/2019 26.0    • Anion Gap 08/28/2019 11    • BUN 08/28/2019 18    • Creatinine 08/28/2019 1.21    • BUN/CREA Ratio 08/28/2019 14.9    • Calcium, Total 08/28/2019 9.4    • Calculated Osmolality 08/28/2019 291    • GFR, Non-Afric 09/18/2022      Patient/Caregiver Education: There are no barriers to learning. Medical education done. Outcome: Patient verbalizes understanding and agrees with plan. Advised to call or RTC if symptoms persist or worsen.     Problem List:   Patient Activ

## 2019-10-16 ENCOUNTER — OFFICE VISIT (OUTPATIENT)
Dept: FAMILY MEDICINE CLINIC | Facility: CLINIC | Age: 54
End: 2019-10-16
Payer: COMMERCIAL

## 2019-10-16 VITALS
HEART RATE: 72 BPM | DIASTOLIC BLOOD PRESSURE: 82 MMHG | TEMPERATURE: 98 F | SYSTOLIC BLOOD PRESSURE: 110 MMHG | OXYGEN SATURATION: 97 % | RESPIRATION RATE: 16 BRPM

## 2019-10-16 DIAGNOSIS — J45.41 MODERATE PERSISTENT ASTHMATIC BRONCHITIS WITH ACUTE EXACERBATION: Primary | ICD-10-CM

## 2019-10-16 PROCEDURE — 94640 AIRWAY INHALATION TREATMENT: CPT | Performed by: NURSE PRACTITIONER

## 2019-10-16 PROCEDURE — 99213 OFFICE O/P EST LOW 20 MIN: CPT | Performed by: NURSE PRACTITIONER

## 2019-10-16 RX ORDER — CLARITHROMYCIN 500 MG/1
500 TABLET, COATED ORAL 2 TIMES DAILY
Qty: 20 TABLET | Refills: 0 | Status: SHIPPED | OUTPATIENT
Start: 2019-10-16 | End: 2019-10-26

## 2019-10-16 RX ORDER — AMOXICILLIN AND CLAVULANATE POTASSIUM 875; 125 MG/1; MG/1
1 TABLET, FILM COATED ORAL 2 TIMES DAILY
Qty: 20 TABLET | Refills: 0 | Status: SHIPPED | OUTPATIENT
Start: 2019-10-16 | End: 2019-10-16 | Stop reason: ALTCHOICE

## 2019-10-16 RX ORDER — IPRATROPIUM BROMIDE AND ALBUTEROL SULFATE 2.5; .5 MG/3ML; MG/3ML
3 SOLUTION RESPIRATORY (INHALATION) ONCE
Status: COMPLETED | OUTPATIENT
Start: 2019-10-16 | End: 2019-10-16

## 2019-10-16 RX ORDER — ALBUTEROL SULFATE 90 UG/1
2 AEROSOL, METERED RESPIRATORY (INHALATION) EVERY 4 HOURS PRN
Qty: 1 INHALER | Refills: 2 | Status: SHIPPED | OUTPATIENT
Start: 2019-10-16 | End: 2019-11-04 | Stop reason: ALTCHOICE

## 2019-10-16 RX ORDER — PREDNISONE 20 MG/1
TABLET ORAL
Qty: 18 TABLET | Refills: 0 | Status: SHIPPED | OUTPATIENT
Start: 2019-10-16 | End: 2019-11-04 | Stop reason: ALTCHOICE

## 2019-10-16 RX ADMIN — IPRATROPIUM BROMIDE AND ALBUTEROL SULFATE 3 ML: 2.5; .5 SOLUTION RESPIRATORY (INHALATION) at 12:10:00

## 2019-10-16 NOTE — PROGRESS NOTES
CHIEF COMPLAINT:   Patient presents with:  Cough    HPI:   Jose Abbasi is a 49year old male who presents for cough. Pt was seen in this clinic on 10/11/19 for a cough which had been lasting about a week and a half.   He and his wife had returned fro Smokeless tobacco: Never Used    Alcohol use: Not Currently    Drug use: No       REVIEW OF SYSTEMS:   GENERAL: see HPI  SKIN: No rashes, or other skin lesions. EYES: Denies blurred vision or double vision.   HENT:  See HPI  CARDIOVASCULAR: Denies pal - Albuterol Sulfate  (90 Base) MCG/ACT Inhalation Aero Soln; Inhale 2 puffs into the lungs every 4 (four) hours as needed for Wheezing. Dispense: 1 Inhaler;  Refill: 2  In-office treatment:  - ipratropium-albuterol (DUONEB) nebulizer solution 3 mL · Your appetite may be low, so a light diet is fine. Stay well hydrated by drinking 6 to 8 glasses of fluids per day. This includes water, soft drinks, sports drinks, juices, tea, or soup. Extra fluids will help loosen mucus in your nose and lungs.   · Over

## 2019-11-04 ENCOUNTER — OFFICE VISIT (OUTPATIENT)
Dept: FAMILY MEDICINE CLINIC | Facility: CLINIC | Age: 54
End: 2019-11-04
Payer: COMMERCIAL

## 2019-11-04 VITALS
DIASTOLIC BLOOD PRESSURE: 80 MMHG | SYSTOLIC BLOOD PRESSURE: 128 MMHG | BODY MASS INDEX: 35 KG/M2 | WEIGHT: 235 LBS | OXYGEN SATURATION: 98 % | HEART RATE: 67 BPM | TEMPERATURE: 98 F | RESPIRATION RATE: 16 BRPM

## 2019-11-04 DIAGNOSIS — T78.40XA ALLERGIC REACTION, INITIAL ENCOUNTER: Primary | ICD-10-CM

## 2019-11-04 PROCEDURE — 99213 OFFICE O/P EST LOW 20 MIN: CPT | Performed by: PHYSICIAN ASSISTANT

## 2019-11-04 RX ORDER — PREDNISONE 20 MG/1
TABLET ORAL
Qty: 10 TABLET | Refills: 0 | Status: SHIPPED | OUTPATIENT
Start: 2019-11-04

## 2019-11-04 RX ORDER — DIAPER,BRIEF,INFANT-TODD,DISP
EACH MISCELLANEOUS
Qty: 60 G | Refills: 0 | Status: SHIPPED | OUTPATIENT
Start: 2019-11-04

## 2019-11-04 RX ORDER — LORATADINE 10 MG/1
10 TABLET ORAL DAILY
Qty: 30 TABLET | Refills: 0 | Status: SHIPPED | OUTPATIENT
Start: 2019-11-04

## 2019-11-04 NOTE — PROGRESS NOTES
CHIEF COMPLAINT:     Patient presents with:  Eyelid Swelling      HPI:   Sasha Pugh is a 49year old male who presents with complaints of swelling, itching,scaling of the skin on his eyelids and around his eyes and on the chin.   He feels like it is Denies headaches or lightheadedness    Positive for stated complaint:  Eyelid swelling/rash. Pertinent positives and negatives noted in the the HPI.     EXAM:   /80   Pulse 67   Temp 98.1 °F (36.7 °C) (Oral)   Resp 16   Wt 235 lb (106.6 kg)   SpO2 9 Risks, benefits, side effects of medication addressed and explained. There are no Patient Instructions on file for this visit. The patient indicates understanding of these issues and agrees to the plan.   The patient is asked to follow up PCP if

## 2019-11-04 NOTE — PATIENT INSTRUCTIONS
General Allergic Reactions  An allergic reaction is a set of symptoms caused by an allergen. An allergen is something that causes a person’s immune system to react. When a person comes in contact with an allergen, it causes the body to release chemicals. Below are some common causes. But remember that almost anything can cause a reaction.  You may not even be aware that you came into contact with one of these things:  · Dust, mold, pollen  · Plants (common ones are poison ivy and poison oak, but there are m · An ice pack will relieve local areas of intense itching and redness. To make an ice pack, put ice cubes in a plastic bag that seals at the top. Wrap it in a thin, clean towel. Don’t put the ice directly on the skin because it can damage the skin.   · Oral Call 911 if any of these occur:  · Trouble breathing or swallowing, wheezing  · Cool, moist, pale skin  · Shortness of breath  · Hoarse voice or trouble speaking  · Confused   · Very drowsy or trouble awakening  · Fainting or loss of consciousness  · Rapid

## 2021-04-02 NOTE — PATIENT INSTRUCTIONS
Bronchitis, Antibiotic Treatment (Adult)    Bronchitis is an infection of the air passages (bronchial tubes) in your lungs. It often occurs when you have a cold.  This illness is contagious during the first few days and is spread through the air by coughi Left detailed message on patient's voice mail at 10:18am to [facility]   Follow up with your healthcare provider, or as advised. If you had an X-ray or ECG (electrocardiogram), a specialist will review it. You will be told of any new test results that may affect your care.   If you are age 72 or older, if you smoke, or if you ha

## 2022-11-12 ENCOUNTER — TELEPHONE (OUTPATIENT)
Dept: FAMILY MEDICINE CLINIC | Facility: CLINIC | Age: 57
End: 2022-11-12

## 2022-11-12 NOTE — TELEPHONE ENCOUNTER
Received medical records request from patient requesting all medical records for continuation of care.  All records printed and mailed to [place], [place]

## 2023-04-13 ENCOUNTER — TELEPHONE (OUTPATIENT)
Dept: FAMILY MEDICINE CLINIC | Facility: CLINIC | Age: 58
End: 2023-04-13

## (undated) NOTE — LETTER
June 5, 2018        [address] [address]      Dear Sharon Benoit:    I am the Nurse Care Manager with Dru Hung MD office. Just reaching out to see how you are doing since your discharge home.    When you have a minute would

## (undated) NOTE — Clinical Note
Dear Dr. Sourav Vela you for referring Jodie An to the [facility] in [facility]. Valencia Cardozo NP

## (undated) NOTE — Clinical Note
I had the pleasure of seeing Mr. Isa Mclaughlin in my office today. Please see my attached note.       Jeremiah Nicolas

## (undated) NOTE — MR AVS SNAPSHOT
[address] [facility] [place], [address]  [address] [place] [id number] [phone]               Thank you for choosing us for your health care visit with Citlali Kyle PA-C.   We are glad to serve you and happy to provide you with this Medical Issues Discussed Today     Screening for thyroid disorder    -  Primary    Screening, anemia, deficiency, iron        Screening for prostate cancer        Family history of heart disease        Chest pain, unspecified type        Family history of damage to the artery lining. Smoking also increases your risk that your blood may clot, block an artery, and cause a heart attack or stroke. Smoking also damages your lungs, which affects the delivery of oxygen to the body.  If you smoke, you are 2 to 4 anna Excess weight makes other risk factors, such as diabetes, more likely.  Excess weight around the waist or stomach increases your heart disease risk the most.  Lack of physical activity  When you’re not active, you’re more likely to develop diabetes, high bl Don’t eat while distracted and slow down. Avoid over sized portions. Don’t eat while when you’re bored.      EAT THESE FOODS MORE OFTEN: EAT THESE FOODS LESS OFTEN:   Make half your plate fruits and vegetables Highly refined, white starches including wh